# Patient Record
Sex: MALE | Race: WHITE | Employment: UNEMPLOYED | URBAN - NONMETROPOLITAN AREA
[De-identification: names, ages, dates, MRNs, and addresses within clinical notes are randomized per-mention and may not be internally consistent; named-entity substitution may affect disease eponyms.]

---

## 2018-08-02 ENCOUNTER — OFFICE VISIT (OUTPATIENT)
Dept: FAMILY MEDICINE | Facility: OTHER | Age: 33
End: 2018-08-02
Attending: NURSE PRACTITIONER
Payer: MEDICARE

## 2018-08-02 VITALS
SYSTOLIC BLOOD PRESSURE: 168 MMHG | BODY MASS INDEX: 31.67 KG/M2 | DIASTOLIC BLOOD PRESSURE: 70 MMHG | HEIGHT: 68 IN | HEART RATE: 80 BPM | WEIGHT: 209 LBS | TEMPERATURE: 98.5 F

## 2018-08-02 DIAGNOSIS — L03.317 CELLULITIS OF BUTTOCK: Primary | ICD-10-CM

## 2018-08-02 PROCEDURE — G0463 HOSPITAL OUTPT CLINIC VISIT: HCPCS

## 2018-08-02 PROCEDURE — 99213 OFFICE O/P EST LOW 20 MIN: CPT | Performed by: NURSE PRACTITIONER

## 2018-08-02 RX ORDER — OLANZAPINE 10 MG/1
TABLET ORAL
Refills: 2 | COMMUNITY
Start: 2018-07-26

## 2018-08-02 RX ORDER — SULFAMETHOXAZOLE/TRIMETHOPRIM 800-160 MG
1 TABLET ORAL 2 TIMES DAILY
Qty: 20 TABLET | Refills: 0 | Status: SHIPPED | OUTPATIENT
Start: 2018-08-02

## 2018-08-02 RX ORDER — BUSPIRONE HYDROCHLORIDE 15 MG/1
TABLET ORAL
Refills: 3 | COMMUNITY
Start: 2018-07-18

## 2018-08-02 ASSESSMENT — PAIN SCALES - GENERAL: PAINLEVEL: EXTREME PAIN (8)

## 2018-08-02 ASSESSMENT — ENCOUNTER SYMPTOMS
FATIGUE: 0
CARDIOVASCULAR NEGATIVE: 1
COUGH: 1
CHILLS: 0
MUSCULOSKELETAL NEGATIVE: 1
GASTROINTESTINAL NEGATIVE: 1
FEVER: 0

## 2018-08-02 NOTE — NURSING NOTE
Patient presents to the clinic for sore on his buttocks that started a couple days ago. Patient reports it hurts to sit down and states the area is getting bigger and more painful. He has tried a hot shower for relief.   Mari DE LEÓN CMA.......8/2/2018..3:10 PM

## 2018-08-02 NOTE — PROGRESS NOTES
SUBJECTIVE:   Brandon Blanco is a 32 year old male presenting with a chief complaint of sore on his buttocks that started a couple days ago. Patient reports it hurts to sit down and states the area is getting bigger and more painful. He has tried a hot shower for relief. Does not have access to a tub.  Has not tried anything for the pain, is in recovery and [facility] and has to sit frequently. Reports has had these numerous times in the past and has used hot soaks to treat them, eventually they open and drain. No other Rx, wants area drained.   Chief Complaint   Patient presents with     Derm Problem     sore on buttocks       He is a new patient of [place].      Onset of symptoms was 2 day(s) ago.  Course of illness is worsening.    Severity moderate  Location: right buttock.   Context: reports his a boil and has history of boils that reoccur on buttocks.   Current and Associated symptoms: redness, warmth, swelling and pain  Treatment measures tried include: warm compress  Relief from treatment: minor  Significant past medical history: history of recurrent boils.         Review of Systems   Constitutional: Negative for chills, fatigue and fever.   HENT: Negative.    Respiratory: Positive for cough.         Smokers cough.    Cardiovascular: Negative.    Gastrointestinal: Negative.    Genitourinary: Negative.    Musculoskeletal: Negative.    Skin: Negative for rash.        Positive for non draining boil on left buttock.        History reviewed. No pertinent past medical history.  History reviewed. No pertinent family history.  Current Outpatient Prescriptions   Medication Sig Dispense Refill     sulfamethoxazole-trimethoprim (BACTRIM DS/SEPTRA DS) 800-160 MG per tablet Take 1 tablet by mouth 2 times daily 20 tablet 0     busPIRone (BUSPAR) 15 MG tablet TAKE 1 TAB BY MOUTH THREE TIMES DAILY  3     OLANZapine (ZYPREXA) 10 MG tablet TAKE 1 TABLET (10 MG) BY MOUTH DAILY AT BEDTIME  2     Social History  "  Substance Use Topics     Smoking status: Current Every Day Smoker     Packs/day: 0.50     Smokeless tobacco: Current User     Types: Chew     Alcohol use No      Comment: in recovery       OBJECTIVE  /70 (BP Location: Left arm, Patient Position: Sitting, Cuff Size: Adult Regular)  Pulse 80  Temp 98.5  F (36.9  C) (Tympanic)  Ht 5' 8\" (1.727 m)  Wt 209 lb (94.8 kg)  BMI 31.78 kg/m2    Physical Exam   Eyes: Conjunctivae are normal. Pupils are equal, round, and reactive to light.   Neck: Normal range of motion.   Cardiovascular: Normal rate, regular rhythm and normal heart sounds.    Pulmonary/Chest: Effort normal and breath sounds normal. No respiratory distress.   Skin: Skin is warm. There is erythema.   7 cm x 4 cm non flocculent deep filled cyst. Erythremic and warm.         Labs:  No results found for this or any previous visit (from the past 24 hour(s)).    X-Ray was not done.    ASSESSMENT:      ICD-10-CM    1. Cellulitis of buttock L03.317 sulfamethoxazole-trimethoprim (BACTRIM DS/SEPTRA DS) 800-160 MG per tablet        Medical Decision Making:    Differential Diagnosis:  Rash: Cellulitis with deep boil/abcess     Serious Comorbid Conditions:  Adult:  Addiction, new in recovey     PLAN:    Cellulitis with deep boil, not amenable to draining at this time. Rx with Bactrim DS  Tylenol or Ibuprofen for pain, fever  Instructed to use frequent warm packs.  Discussed infection control measures. Possible referral to general surgeon - pt declines.   Discussed S&Sx to return for and to follow up with PCP.  Pt with no further questions.     Followup:    If not improving or if condition worsens, follow up with your Primary Care Provider    Patient Instructions   Cellulitis - Take theantibiotic as prescribed. Antibiotic has been sent to pharmacy. Please take full course of antibiotic even if symptoms have completely resolved. This helps prevent against antibiotic resistance.     Bactrim DS take twice a day for " 10 days. About 1 hour after you take this you should eat some yogurt.     You should also apply some warm packs to the area - use a clean warm washcloth and hold it in the area.  Do this 3-4 times, at least 3-4 times per day.     Watch for any signsof increasing infection (redness, pus, increased pain or redness, increased swelling or fever). Call if any of these signs occur. Monitor for fevers or chills.    You may draw line around area of redness to monitorarea. Please return to clinic if redness is continuing to spread after 2-3 days.    Call or return to clinic as needed if your symptoms worsen or fail to improve as anticipated.

## 2018-08-02 NOTE — PATIENT INSTRUCTIONS
Cellulitis - Take theantibiotic as prescribed. Antibiotic has been sent to pharmacy. Please take full course of antibiotic even if symptoms have completely resolved. This helps prevent against antibiotic resistance.     Bactrim DS take twice a day for 10 days. About 1 hour after you take this you should eat some yogurt.     You should also apply some warm packs to the area - use a clean warm washcloth and hold it in the area.  Do this 3-4 times, at least 3-4 times per day.     Watch for any signsof increasing infection (redness, pus, increased pain or redness, increased swelling or fever). Call if any of these signs occur. Monitor for fevers or chills.    You may draw line around area of redness to monitorarea. Please return to clinic if redness is continuing to spread after 2-3 days.    Call or return to clinic as needed if your symptoms worsen or fail to improve as anticipated.

## 2018-08-02 NOTE — MR AVS SNAPSHOT
After Visit Summary   8/2/2018    Brandon Blanco    MRN: [id number]           Patient Information     Date Of Birth          1985        Visit Information        Provider Department      8/2/2018 3:00 PM Bouchra Cline APRN CNP [facility]        Today's Diagnoses     Cellulitis of buttock    -  1      Care Instructions    Cellulitis - Take theantibiotic as prescribed. Antibiotic has been sent to pharmacy. Please take full course of antibiotic even if symptoms have completely resolved. This helps prevent against antibiotic resistance.     Bactrim DS take twice a day for 10 days. About 1 hour after you take this you should eat some yogurt.     You should also apply some warm packs to the area - use a clean warm washcloth and hold it in the area.  Do this 3-4 times, at least 3-4 times per day.     Watch for any signsof increasing infection (redness, pus, increased pain or redness, increased swelling or fever). Call if any of these signs occur. Monitor for fevers or chills.    You may draw line around area of redness to monitorarea. Please return to clinic if redness is continuing to spread after 2-3 days.    Call or return to clinic as needed if your symptoms worsen or fail to improve as anticipated.              Follow-ups after your visit        Who to contact     If you have questions or need follow up information about today's clinic visit or your schedule please contact [facility] directly at [phone].  Normal or non-critical lab and imaging results will be communicated to you by MyChart, letter or phone within 4 business days after the clinic has received the results. If you do not hear from us within 7 days, please contact the clinic through MyChart or phone. If you have a critical or abnormal lab result, we will notify you by phone as soon as possible.  Submit refill requests through [employer] or call your pharmacy and they will forward the  "refill request to us. Please allow 3 business days for your refill to be completed.          Additional Information About Your Visit        MyChart Information     [employer] lets you send messages to your doctor, view your test results, renew your prescriptions, schedule appointments and more. To sign up, go to [email or web address] . Click on \"Log in\" on the left side of the screen, which will take you to the Welcome page. Then click on \"Sign up Now\" on the right side of the page.     You will be asked to enter the access code listed below, as well as some personal information. Please follow the directions to create your username and password.     Your access code is: [email or web address]  Expires: 10/31/2018  3:47 PM     Your access code will  in 90 days. If you need help or a new code, please call your [place] clinic or [phone].        Care EveryWhere ID     This is your Care EveryWhere ID. This could be used by other organizations to access your [place] medical records  [id number]        Your Vitals Were     Pulse Temperature Height BMI (Body Mass Index)          80 98.5  F (36.9  C) (Tympanic) 5' 8\" (1.727 m) 31.78 kg/m2         Blood Pressure from Last 3 Encounters:   18 168/70    Weight from Last 3 Encounters:   18 209 lb (94.8 kg)              Today, you had the following     No orders found for display         Today's Medication Changes          These changes are accurate as of 18  3:47 PM.  If you have any questions, ask your nurse or doctor.               Start taking these medicines.        Dose/Directions    sulfamethoxazole-trimethoprim 800-160 MG per tablet   Commonly known as:  BACTRIM DS/SEPTRA DS   Used for:  Cellulitis of buttock   Started by:  Bouchra Cline APRN CNP        Dose:  1 tablet   Take 1 tablet by mouth 2 times daily   Quantity:  20 tablet   Refills:  0            Where to get your medicines      These medications were sent to [id number] - " [place], [place] - [address]  [address], [place] [address]     Phone:  [phone]     sulfamethoxazole-trimethoprim 800-160 MG per tablet                Primary Care Provider Fax #    Physician No Ref-Primary [phone]       No address on file        Equal Access to Services     FRANK PULIDO : Hadii aad ku hadasho Soomaali, waaxda luqadaha, qaybta kaalmada adeegyada, conrad ro hayligian adeihsan khchadabraham sutton . So [facility] [phone].    ATENCIÓN: Si habla español, tiene a chilel disposición servicios gratuitos de asistencia lingüística. Traname al [phone].    We comply with applicable federal civil rights laws and [place] laws. We do not discriminate on the basis of race, color, national origin, age, disability, sex, sexual orientation, or gender identity.            Thank you!     Thank you for choosing [facility]  for your care. Our goal is always to provide you with excellent care. Hearing back from our patients is one way we can continue to improve our services. Please take a few minutes to complete the written survey that you may receive in the mail after your visit with us. Thank you!             Your Updated Medication List - Protect others around you: Learn how to safely use, store and throw away your medicines at www.disposemymeds.org.          This list is accurate as of 8/2/18  3:47 PM.  Always use your most recent med list.                   Brand Name Dispense Instructions for use Diagnosis    busPIRone 15 MG tablet    BUSPAR     TAKE 1 TAB BY MOUTH THREE TIMES DAILY        OLANZapine 10 MG tablet    zyPREXA     TAKE 1 TABLET (10 MG) BY MOUTH DAILY AT BEDTIME        sulfamethoxazole-trimethoprim 800-160 MG per tablet    BACTRIM DS/SEPTRA DS    20 tablet    Take 1 tablet by mouth 2 times daily    Cellulitis of buttock